# Patient Record
Sex: MALE | Race: BLACK OR AFRICAN AMERICAN | Employment: FULL TIME | URBAN - METROPOLITAN AREA
[De-identification: names, ages, dates, MRNs, and addresses within clinical notes are randomized per-mention and may not be internally consistent; named-entity substitution may affect disease eponyms.]

---

## 2018-08-21 ENCOUNTER — OFFICE VISIT (OUTPATIENT)
Dept: FAMILY MEDICINE CLINIC | Age: 28
End: 2018-08-21

## 2018-08-21 VITALS
DIASTOLIC BLOOD PRESSURE: 66 MMHG | BODY MASS INDEX: 23.77 KG/M2 | HEIGHT: 70 IN | HEART RATE: 61 BPM | SYSTOLIC BLOOD PRESSURE: 113 MMHG | TEMPERATURE: 97.8 F | RESPIRATION RATE: 16 BRPM | OXYGEN SATURATION: 100 % | WEIGHT: 166 LBS

## 2018-08-21 DIAGNOSIS — J30.1 SEASONAL ALLERGIC RHINITIS DUE TO POLLEN: ICD-10-CM

## 2018-08-21 DIAGNOSIS — G43.009 MIGRAINE WITHOUT AURA AND WITHOUT STATUS MIGRAINOSUS, NOT INTRACTABLE: Primary | ICD-10-CM

## 2018-08-21 RX ORDER — ELETRIPTAN HYDROBROMIDE 40 MG/1
40 TABLET, FILM COATED ORAL
Qty: 6 TAB | Refills: 2 | Status: SHIPPED | OUTPATIENT
Start: 2018-08-21 | End: 2018-08-21

## 2018-08-21 RX ORDER — FLUTICASONE PROPIONATE 50 MCG
2 SPRAY, SUSPENSION (ML) NASAL DAILY
Qty: 1 BOTTLE | Refills: 3 | Status: SHIPPED | OUTPATIENT
Start: 2018-08-21 | End: 2019-04-05

## 2018-08-21 RX ORDER — CETIRIZINE HCL 10 MG
10 TABLET ORAL
Qty: 30 TAB | Refills: 3 | Status: SHIPPED | OUTPATIENT
Start: 2018-08-21 | End: 2019-04-05

## 2018-08-21 NOTE — MR AVS SNAPSHOT
[address] [id number] 
[phone] Patient: Rubina Worthy MRN: [id number] XTC:[id number] Visit Information Date & Time Provider Department Dept. Phone Encounter #  
 8/21/2018  1:15 PM Dandy Huizar, [address] [place] [address] [phone] Follow-up Instructions Return in about 3 months (around 11/21/2018). Upcoming Health Maintenance Date Due DTaP/Tdap/Td series (1 - Tdap) 2/1/2009 Influenza Age 5 to Adult 8/1/2018 Allergies as of 8/21/2018  Review Complete On: 8/21/2018 By: Selvin Khanna LPN Severity Noted Reaction Type Reactions Amoxicillin  08/21/2018    Hives, Rash Current Immunizations  Never Reviewed No immunizations on file. Not reviewed this visit You Were Diagnosed With   
  
 Codes Comments Migraine without aura and without status migrainosus, not intractable    -  Primary ICD-10-CM: G43.009 ICD-9-CM: 346.10 Seasonal allergic rhinitis due to pollen     ICD-10-CM: J30.1 ICD-9-CM: 477.0 Vitals BP Pulse Temp Resp Height(growth percentile) Weight(growth percentile) 113/66 (BP 1 Location: Right arm, BP Patient Position: Sitting) 61 97.8 °F (36.6 °C) (Oral) 16 5' 10\" (1.778 m) 166 lb (75.3 kg) SpO2 BMI Smoking Status 100% 23.82 kg/m2 Never Smoker Vitals History BMI and BSA Data Body Mass Index Body Surface Area  
 23.82 kg/m 2 1.93 m 2 Preferred Pharmacy Pharmacy Name Phone [facility] [address] [place] [address] Stacey Herrera Your Updated Medication List  
  
   
This list is accurate as of 8/21/18  2:31 PM.  Always use your most recent med list.  
  
  
  
  
 cetirizine 10 mg tablet Commonly known as:  ZYRTEC Take 1 Tab by mouth nightly. eletriptan 40 mg tablet Commonly known as:  RELPAX Take 1 Tab by mouth once as needed for up to 1 dose. may repeat in 2 hours if necessary  
  
 fluticasone 50 mcg/actuation nasal spray Commonly known as:  [place] 2 Sprays by Both Nostrils route daily. Prescriptions Sent to Pharmacy Refills  
 fluticasone (FLONASE) 50 mcg/actuation nasal spray 3 Si Sprays by Both Nostrils route daily. Class: Normal  
 Pharmacy: [facility] [address] Ph #: [phone] Route: Both Nostrils  
 cetirizine (ZYRTEC) 10 mg tablet 3 Sig: Take 1 Tab by mouth nightly. Class: Normal  
 Pharmacy: [facility] [address] Ph #: [phone] Route: Oral  
 eletriptan (RELPAX) 40 mg tablet 2 Sig: Take 1 Tab by mouth once as needed for up to 1 dose. may repeat in 2 hours if necessary Class: Normal  
 Pharmacy: [facility] [address] #: [phone] Route: Oral  
  
Follow-up Instructions Return in about 3 months (around 2018). To-Do List   
 2018 Lab:  CBC WITH AUTOMATED DIFF   
  
 2018 Lab:  METABOLIC PANEL, COMPREHENSIVE   
  
 2018 Lab:  SED RATE (ESR) Introducing [facility]! Romayne Duster introduces JRK[employer] patient portal. Now you can access parts of your medical record, email your doctor's office, and request medication refills online. 1. In your internet browser, go to [email or web address] 2. Click on the First Time User? Click Here link in the Sign In box. You will see the New Member Sign Up page. 3. Enter your JRK[employer]Z Access Code exactly as it appears below. You will not need to use this code after youve completed the sign-up process. If you do not sign up before the expiration date, you must request a new code. · Ticket Cake Access Code: [email or web address] Expires: 11/19/2018  1:06 PM 
 
4. Enter the last four digits of your Social Security Number (xxxx) and Date of Birth (mm/dd/yyyy) as indicated and click Submit. You will be taken to the next sign-up page. 5. Create a Ticket Cake ID. This will be your Ticket Cake login ID and cannot be changed, so think of one that is secure and easy to remember. 6. Create a Ticket Cake password. You can change your password at any time. 7. Enter your Password Reset Question and Answer. This can be used at a later time if you forget your password. 8. Enter your e-mail address. You will receive e-mail notification when new information is available in [address] 9. Click Sign Up. You can now view and download portions of your medical record. 10. Click the Download Summary menu link to download a portable copy of your medical information. If you have questions, please visit the Frequently Asked Questions section of the [employer] Cake website. Remember, Ticket Cake is NOT to be used for urgent needs. For medical emergencies, dial 911. Now available from your iPhone and Android! Please provide this summary of care documentation to your next provider. Your primary care clinician is listed as Maegan Braxton. If you have any questions after today's visit, please call [phone].

## 2018-08-21 NOTE — PROGRESS NOTES
Identified pt with two pt identifiers(name and ). Reviewed record in preparation for visit and have obtained necessary documentation. Chief Complaint   Patient presents with    Establish Care    Headache     pt c/o recurring headaches for approx 2 wks;pt reports pain behind eyes, photosensitivity, nausea along with headaches; pt states excedrin PM helped some        Health Maintenance Due   Topic    DTaP/Tdap/Td series (1 - Tdap)    Influenza Age 5 to Adult        Coordination of Care Questionnaire:  :   1) Have you been to an emergency room, urgent care clinic since your last visit? no   Hospitalized since your last visit? no             2. Have seen or consulted any other health care provider since your last visit? NO  If yes, where when, and reason for visit? 3) Do you have an Advanced Directive/ Living Will in place? NO  If yes, do we have a copy on file NO  If no, would you like information NO    Patient is accompanied by self I have received verbal consent from Aysha Heath to discuss any/all medical information while they are present in the room.

## 2018-08-21 NOTE — PROGRESS NOTES
Mitchell Feliz is a 25year old male presenting today with complain of migraines. They started a couple weeks ago occurring in the night for about 3 consecutive nights and then resolving. He currently doesn't have the migraines as of late. He has migraines back in his teenage years and early 25s. He believes it was from a lot of eye strain. Excedrin brought relief. No provocative factors were noted. The migraines were mostly concentrated in the temporal region with with occasional diffuse radiation. They are 6/10 when the migraines flare up. They are more common at night. Pt denies N/V/CP/SOB, fever, or auras, but admits to phonophobia and photophobia and sensitivity to smell as vertigo with standing when he has the migraines. PMH:   No relevant PMH    PSH:   No Major Surgeries     Meds:   Excedrin for migraines, PO, does not known    Allergies:  - Amoxicillin, diffuse rash (believes this was just do to taking too much at one point)   -  No food or environmental allergies    FH:  - Mother, healthy, Diabetes runs in moms side (aunt and cousin)   - Father, health, No diseases known on fathers side    SH:  - No tobacco etOH or street drug use   - Immunizations up to date  - Single, Sexually active, previously multiple partners now just one, was treated for a possible STI with resolution in the past, no recent concerns   - Doesn't eat much, doesn't have much of an appetite, eats a considerable amount of sugar (candy), typical [place] diet otherwise. Avoids pork and red meat (<1/wk). -  calisthenics, weight lifting, not much cardio. 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 Positive for seasonal allergies.    - Negative for chest pain and SOB  - Negative for abdominal pain, N/V, diarrhea, constipation or blood in stools/black stools  - Negative for urinary frequency, urgency, retention or incontinence   - Negative for MSK or join pains   - Negative for concerning skin findings  - Negative for weakness, tingling, or numbness  - Negative for anxiety and depression    PHYSICAL:   - No conjunctival pallor, PERRLA, EOMI, no dizziness with EOM. Ears clear with intact TM, No erythema of nose and no congestion. No pharyngeal erythema, and no congestion, no LAD, thyroid elevates symmetrically and is of appropriate size  - Heart is RRR with no murmurs or rubs, lungs CTA in all fields   - Abdomen is nontener to palpation and has no dullness to percussion   - 2+ radial pulses, 2+ posterior tibial pulses, no LE edema   - 2+ Patellar reflexes bilateral reflexes         *ATTENTION:  This note has been created by a medical student for educational purposes only. Please do not refer to the content of this note for clinical decision-making, billing, or other purposes. Please see attending physicians note to obtain clinical information on this patient. *

## 2018-08-21 NOTE — PROGRESS NOTES
Farzaneh Miller is a 25 y.o.  male and presents with     Chief Complaint   Patient presents with    Establish Care    Headache     pt c/o recurring headaches for approx 2 wks;pt reports pain behind eyes, photosensitivity, nausea along with headaches; pt states excedrin PM helped some    Allergic Rhinitis       Pt is here to establish care. Pt works as  and says he has stress at work. Pt has been having headaches  For past 2 weeks and it seems excedrin helps to some extent. Pt says he had migraine when he was teenager. Pt says light and sound makes it worse. No FH of brain tumors, aneurysms. Past Medical History:   Diagnosis Date    Headache      History reviewed. No pertinent surgical history. Current Outpatient Prescriptions   Medication Sig    fluticasone (FLONASE) 50 mcg/actuation nasal spray 2 Sprays by Both Nostrils route daily.  cetirizine (ZYRTEC) 10 mg tablet Take 1 Tab by mouth nightly.  eletriptan (RELPAX) 40 mg tablet Take 1 Tab by mouth once as needed for up to 1 dose. may repeat in 2 hours if necessary     No current facility-administered medications for this visit. Health Maintenance   Topic Date Due    DTaP/Tdap/Td series (1 - Tdap) 02/01/2009    Influenza Age 5 to Adult  08/01/2018       There is no immunization history on file for this patient. No LMP for male patient. Allergies and Intolerances: Allergies   Allergen Reactions    Amoxicillin Hives and Rash       Family History:   Family History   Problem Relation Age of Onset    No Known Problems Mother     No Known Problems Father     Diabetes Maternal Aunt     Diabetes Cousin        Social History:   He  reports that he has never smoked. He has never used smokeless tobacco.  He  reports that he does not drink alcohol.             Review of Systems:   General: negative for - chills, fatigue, fever, weight change  Psych: negative for - anxiety, depression, irritability or mood swings  ENT: negative for - headaches, hearing change, nasal congestion, oral lesions, sneezing or sore throat  Heme/ Lymph: negative for - bleeding problems, bruising, pallor or swollen lymph nodes  Endo: negative for - hot flashes, polydipsia/polyuria or temperature intolerance  Resp: negative for - cough, shortness of breath or wheezing  CV: negative for - chest pain, edema or palpitations  GI: negative for - abdominal pain, change in bowel habits, constipation, diarrhea or nausea/vomiting  : negative for - dysuria, hematuria, incontinence, pelvic pain or vulvar/vaginal symptoms  MSK: negative for - joint pain, joint swelling or muscle pain  Neuro: negative for - confusion, pos for  headaches  Derm: negative for - dry skin, hair changes, rash or skin lesion changes          Physical:   Vitals:   Vitals:    08/21/18 1338   BP: 113/66   Pulse: 61   Resp: 16   Temp: 97.8 °F (36.6 °C)   TempSrc: Oral   SpO2: 100%   Weight: 166 lb (75.3 kg)   Height: 5' 10\" (1.778 m)           Exam:   HEENT- atraumatic,normocephalic, awake, oriented, well nourished,nose severely congested. thrpat congested. Neck - supple,no enlarged lymph nodes, no JVD, no thyromegaly  Chest- CTA, no rhonchi, no crackles  Heart- rrr, no murmurs / gallop/rub  Abdomen- soft,BS+,NT, no hepatosplenomegaly  Ext - no c/c/edema   Neuro- no focal deficits. Power 5/5 all extremities  Skin - warm,dry, no obvious rashes. Review of Data:   LABS:   No results found for: WBC, HGB, HCT, PLT, HGBEXT, HCTEXT, PLTEXT, HGBEXT, HCTEXT, PLTEXT  No results found for: NA, K, CL, CO2, GLU, BUN, CREA  No results found for: CHOL, CHOLX, CHLST, CHOLV, HDL, LDL, LDLC, DLDLP, TGLX, TRIGL, TRIGP  No results found for: GPT        Impression / Plan:        ICD-10-CM ICD-9-CM    1.  Migraine without aura and without status migrainosus, not intractable G43.009 346.10 CBC WITH AUTOMATED DIFF      METABOLIC PANEL, COMPREHENSIVE      eletriptan (RELPAX) 40 mg tablet      SED RATE (ESR)      CBC WITH AUTOMATED DIFF      METABOLIC PANEL, COMPREHENSIVE      SED RATE (ESR)   2. Seasonal allergic rhinitis due to pollen J30.1 477.0 fluticasone (FLONASE) 50 mcg/actuation nasal spray      cetirizine (ZYRTEC) 10 mg tablet         Explained to patient risk benefits of the medications. Advised patient to stop meds if having any side effects. Pt verbalized understanding of the instructions. I have discussed the diagnosis with the patient and the intended plan as seen in the above orders. The patient has received an after-visit summary and questions were answered concerning future plans. I have discussed medication side effects and warnings with the patient as well. I have reviewed the plan of care with the patient, accepted their input and they are in agreement with the treatment goals. Reviewed plan of care. Patient has provided input and agrees with goals. Follow-up Disposition:  Return in about 3 months (around 11/21/2018).     Katina Rocha MD

## 2018-08-22 LAB
A-G RATIO,AGRAT: 1.6 RATIO (ref 1.1–2.6)
ABSOLUTE LYMPHOCYTE COUNT, 10803: 2.1 K/UL (ref 1–4.8)
ALBUMIN SERPL-MCNC: 4.6 G/DL (ref 3.5–5)
ALP SERPL-CCNC: 63 U/L (ref 25–115)
ALT SERPL-CCNC: 15 U/L (ref 5–40)
ANION GAP SERPL CALC-SCNC: 15 MMOL/L
AST SERPL W P-5'-P-CCNC: 13 U/L (ref 10–37)
BASOPHILS # BLD: 0 K/UL (ref 0–0.2)
BASOPHILS NFR BLD: 1 % (ref 0–2)
BILIRUB SERPL-MCNC: 0.4 MG/DL (ref 0.2–1.2)
BUN SERPL-MCNC: 6 MG/DL (ref 6–22)
CALCIUM SERPL-MCNC: 9 MG/DL (ref 8.4–10.4)
CHLORIDE SERPL-SCNC: 103 MMOL/L (ref 98–110)
CO2 SERPL-SCNC: 26 MMOL/L (ref 20–32)
CREAT SERPL-MCNC: 1 MG/DL (ref 0.5–1.2)
EOSINOPHIL # BLD: 0.1 K/UL (ref 0–0.5)
EOSINOPHIL NFR BLD: 3 % (ref 0–6)
ERYTHROCYTE [DISTWIDTH] IN BLOOD BY AUTOMATED COUNT: 13.5 % (ref 10–15.5)
GFRAA, 66117: >60
GFRNA, 66118: >60
GLOBULIN,GLOB: 2.8 G/DL (ref 2–4)
GLUCOSE SERPL-MCNC: 89 MG/DL (ref 70–99)
GRANULOCYTES,GRANS: 44 % (ref 40–75)
HCT VFR BLD AUTO: 41 % (ref 36.6–51.9)
HGB BLD-MCNC: 13.4 G/DL (ref 13.2–17.3)
LYMPHOCYTES, LYMLT: 46 % (ref 20–45)
MCH RBC QN AUTO: 31 PG (ref 26–34)
MCHC RBC AUTO-ENTMCNC: 33 G/DL (ref 31–36)
MCV RBC AUTO: 95 FL (ref 80–95)
MONOCYTES # BLD: 0.3 K/UL (ref 0.1–1)
MONOCYTES NFR BLD: 6 % (ref 3–12)
NEUTROPHILS # BLD AUTO: 2 K/UL (ref 1.8–7.7)
PLATELET # BLD AUTO: 224 K/UL (ref 140–440)
PMV BLD AUTO: 10.3 FL (ref 9–13)
POTASSIUM SERPL-SCNC: 4.2 MMOL/L (ref 3.5–5.5)
PROT SERPL-MCNC: 7.4 G/DL (ref 6.4–8.3)
RBC # BLD AUTO: 4.31 M/UL (ref 3.8–5.8)
SED RATE (ESR): 3 MM/HR (ref 0–15)
SODIUM SERPL-SCNC: 144 MMOL/L (ref 133–145)
WBC # BLD AUTO: 4.4 K/UL (ref 4–11)

## 2019-01-22 ENCOUNTER — OFFICE VISIT (OUTPATIENT)
Dept: FAMILY MEDICINE CLINIC | Age: 29
End: 2019-01-22

## 2019-01-22 ENCOUNTER — HOSPITAL ENCOUNTER (OUTPATIENT)
Dept: LAB | Age: 29
Discharge: HOME | End: 2019-01-22

## 2019-01-22 VITALS
RESPIRATION RATE: 18 BRPM | TEMPERATURE: 98.2 F | OXYGEN SATURATION: 100 % | BODY MASS INDEX: 22.33 KG/M2 | SYSTOLIC BLOOD PRESSURE: 133 MMHG | WEIGHT: 156 LBS | HEART RATE: 65 BPM | DIASTOLIC BLOOD PRESSURE: 68 MMHG | HEIGHT: 70 IN

## 2019-01-22 DIAGNOSIS — Z00.00 ANNUAL PHYSICAL EXAM: Primary | ICD-10-CM

## 2019-01-22 DIAGNOSIS — Z00.00 ANNUAL PHYSICAL EXAM: ICD-10-CM

## 2019-01-22 LAB — SENTARA SPECIMEN COL,SENBCF: NORMAL

## 2019-01-22 PROCEDURE — 99001 SPECIMEN HANDLING PT-LAB: CPT

## 2019-01-22 NOTE — PROGRESS NOTES
Lucio Silvestre is a 25 y.o.  male and presents with Chief Complaint Patient presents with  Results  Annual Exam  
 
 
Pt is here for annual physical. Pt does not drink or smoke . He works as a . No urinary symptoms No rectal bleeding , melena. Pt has mild wt loss but he says he weigher more ine the past and now is at his baseline. He says migraine headaches have improved. Past Medical History:  
Diagnosis Date  Headache No past surgical history on file. Current Outpatient Medications Medication Sig  
 fluticasone (FLONASE) 50 mcg/actuation nasal spray 2 Sprays by Both Nostrils route daily.  cetirizine (ZYRTEC) 10 mg tablet Take 1 Tab by mouth nightly. No current facility-administered medications for this visit. Health Maintenance Topic Date Due  
 DTaP/Tdap/Td series (1 - Tdap) 02/01/2009  Influenza Age 5 to Adult  08/01/2018 There is no immunization history on file for this patient. No LMP for male patient. Allergies and Intolerances: Allergies Allergen Reactions  Amoxicillin Hives and Rash Family History:  
Family History Problem Relation Age of Onset  No Known Problems Mother  No Known Problems Father  Diabetes Maternal Aunt  Diabetes Cousin Social History: He  reports that  has never smoked. he has never used smokeless tobacco.  He  reports that he does not drink alcohol. Review of Systems:  
General: negative for - chills, fatigue, fever, weight change Psych: negative for - anxiety, depression, irritability or mood swings ENT: negative for - headaches, hearing change, nasal congestion, oral lesions, sneezing or sore throat Heme/ Lymph: negative for - bleeding problems, bruising, pallor or swollen lymph nodes Endo: negative for - hot flashes, polydipsia/polyuria or temperature intolerance Resp: negative for - cough, shortness of breath or wheezing CV: negative for - chest pain, edema or palpitations GI: negative for - abdominal pain, change in bowel habits, constipation, diarrhea or nausea/vomiting : negative for - dysuria, hematuria, incontinence, pelvic pain or vulvar/vaginal symptoms MSK: negative for - joint pain, joint swelling or muscle pain Neuro: negative for - confusion, headaches, seizures or weakness Derm: negative for - dry skin, hair changes, rash or skin lesion changes Physical:  
Vitals:  
Vitals:  
 01/22/19 1400 BP: 133/68 Pulse: 65 Resp: 18 Temp: 98.2 °F (36.8 °C) SpO2: 100% Weight: 156 lb (70.8 kg) Height: 5' 10\" (1.778 m) Exam:  
HEENT- atraumatic,normocephalic, awake, oriented, well nourished Neck - supple,no enlarged lymph nodes, no JVD, no thyromegaly Chest- CTA, no rhonchi, no crackles Heart- rrr, no murmurs / gallop/rub Abdomen- soft,BS+,NT, no hepatosplenomegaly Ext - no c/c/edema Neuro- no focal deficits. Power 5/5 all extremities Skin - warm,dry, no obvious rashes. Review of Data:  
LABS:  
Lab Results Component Value Date/Time WBC 4.4 08/21/2018 02:39 PM  
 HGB 13.4 08/21/2018 02:39 PM  
 HCT 41.0 08/21/2018 02:39 PM  
 PLATELET 762 56/10/9664 02:39 PM  
 
Lab Results Component Value Date/Time Sodium 144 08/21/2018 02:39 PM  
 Potassium 4.2 08/21/2018 02:39 PM  
 Chloride 103 08/21/2018 02:39 PM  
 CO2 26 08/21/2018 02:39 PM  
 Glucose 89 08/21/2018 02:39 PM  
 BUN 6 08/21/2018 02:39 PM  
 Creatinine 1.0 08/21/2018 02:39 PM  
 
No results found for: CHOL, CHOLX, CHLST, CHOLV, HDL, LDL, LDLC, DLDLP, TGLX, TRIGL, TRIGP No results found for: GPT Impression / Plan: ICD-10-CM ICD-9-CM 1. Annual physical exam Z00.00 V70.0 LIPID PANEL  
   TSH 3RD GENERATION  
   AMB POC EKG ROUTINE W/ 12 LEADS, INTER & REP  
 
EKG - normal sinus rhythm, no acute ST- T changes, patholgical Q waves. Headaches - resolved. Explained to patient risk benefits of the medications. Advised patient to stop meds if having any side effects. Pt verbalized understanding of the instructions. I have discussed the diagnosis with the patient and the intended plan as seen in the above orders. The patient has received an after-visit summary and questions were answered concerning future plans. I have discussed medication side effects and warnings with the patient as well. I have reviewed the plan of care with the patient, accepted their input and they are in agreement with the treatment goals. Reviewed plan of care. Patient has provided input and agrees with goals. Follow-up Disposition: 
Return in about 1 year (around 1/22/2020).  
 
Natan Garcia MD

## 2019-01-23 LAB
CHOLEST SERPL-MCNC: 171 MG/DL (ref 110–200)
HDLC SERPL-MCNC: 2.9 MG/DL (ref 0–5)
HDLC SERPL-MCNC: 59 MG/DL (ref 40–59)
LDLC SERPL CALC-MCNC: 99 MG/DL (ref 50–99)
TRIGL SERPL-MCNC: 64 MG/DL (ref 40–149)
TSH SERPL DL<=0.005 MIU/L-ACNC: 1.64 MCU/ML (ref 0.27–4.2)
VLDLC SERPL CALC-MCNC: 13 MG/DL (ref 8–30)

## 2019-04-05 ENCOUNTER — ANESTHESIA EVENT (OUTPATIENT)
Dept: SURGERY | Age: 29
End: 2019-04-05
Payer: COMMERCIAL

## 2019-04-05 NOTE — PERIOP NOTES
PAT - SURGICAL PRE-ADMISSION INSTRUCTIONS 
 
NAME:  Julia Rangel                                                          TODAY'S DATE:  4/5/2019 SURGERY DATE:  4/8/2019                                  SURGERY ARRIVAL TIME:   TBA 1. Do NOT eat or drink anything, including candy or gum, after MIDNIGHT on 4/7/19 , unless you have specific instructions from your Surgeon or Anesthesia Provider to do so. 2. No smoking on the day of surgery. 3. No alcohol 24 hours prior to the day of surgery. 4. No recreational drugs for one week prior to the day of surgery. 5. Leave all valuables, including money/purse, at home. 6. Remove all jewelry, nail polish, makeup (including mascara); no lotions, powders, deodorant, or perfume/cologne/after shave. 7. Glasses/Contact lenses and Dentures may be worn to the hospital.  They will be removed prior to surgery. 8. Call your doctor if symptoms of a cold or illness develop within 24 ours prior to surgery. 9. AN ADULT MUST DRIVE YOU HOME AFTER OUTPATIENT SURGERY. 10. If you are having an OUTPATIENT procedure, please make arrangements for a responsible adult to be with you for 24 hours after your surgery. 11. If you are admitted to the hospital, you will be assigned to a bed after surgery is complete. Normally a family member will not be able to see you until you are in your assigned bed. 15. Family is encouraged to accompany you to the hospital.  We ask visitors in the treatment area to be limited to ONE person at a time to ensure patient privacy. EXCEPTIONS WILL BE MADE AS NEEDED. 15. Children under 12 are discouraged from entering the treatment area and need to be supervised by an adult when in the waiting room. Special Instructions: 
 
NONE. Patient Prep: 
 
shower with anti-bacterial soap These surgical instructions were reviewed with PATIENT during the PAT PHONE CALL. Directions:   On the morning of surgery, please go to the [facility]. Enter the building from the [facility] entrance, 1st floor (next to the Emergency Room entrance). Take the elevator to the 2nd floor. Sign in at the Registration Desk. If you have any questions and/or concerns, please do not hesitate to call: 
(Prior to the day of surgery)  [facility] unit:  [phone] (Day of surgery)  [facility] unit:  [phone]

## 2019-04-08 ENCOUNTER — ANESTHESIA (OUTPATIENT)
Dept: SURGERY | Age: 29
End: 2019-04-08
Payer: COMMERCIAL

## 2019-04-08 ENCOUNTER — HOSPITAL ENCOUNTER (OUTPATIENT)
Age: 29
Setting detail: OUTPATIENT SURGERY
Discharge: HOME | End: 2019-04-08
Attending: OPHTHALMOLOGY | Admitting: OPHTHALMOLOGY
Payer: COMMERCIAL

## 2019-04-08 VITALS
SYSTOLIC BLOOD PRESSURE: 136 MMHG | HEART RATE: 78 BPM | RESPIRATION RATE: 16 BRPM | DIASTOLIC BLOOD PRESSURE: 52 MMHG | BODY MASS INDEX: 21.93 KG/M2 | TEMPERATURE: 98 F | OXYGEN SATURATION: 99 % | HEIGHT: 70 IN | WEIGHT: 153.19 LBS

## 2019-04-08 PROCEDURE — 74011250636 HC RX REV CODE- 250/636

## 2019-04-08 PROCEDURE — 74011250636 HC RX REV CODE- 250/636: Performed by: OPHTHALMOLOGY

## 2019-04-08 PROCEDURE — 74011000250 HC RX REV CODE- 250: Performed by: OPHTHALMOLOGY

## 2019-04-08 PROCEDURE — 77030029082 HC LEN VITRCTMY DISP DTCH -B: Performed by: OPHTHALMOLOGY

## 2019-04-08 PROCEDURE — 74011250637 HC RX REV CODE- 250/637: Performed by: NURSE ANESTHETIST, CERTIFIED REGISTERED

## 2019-04-08 PROCEDURE — 76010000161 HC OR TIME 1 TO 1.5 HR INTENSV-TIER 1: Performed by: OPHTHALMOLOGY

## 2019-04-08 PROCEDURE — 77030013311: Performed by: OPHTHALMOLOGY

## 2019-04-08 PROCEDURE — 77030026076 HC LNS MCSCP SPRVW DSP MCGM -B: Performed by: OPHTHALMOLOGY

## 2019-04-08 PROCEDURE — 77030018838 HC SOL IRR OPTH ALCN -B: Performed by: OPHTHALMOLOGY

## 2019-04-08 PROCEDURE — 76060000033 HC ANESTHESIA 1 TO 1.5 HR: Performed by: OPHTHALMOLOGY

## 2019-04-08 PROCEDURE — 77030016693: Performed by: OPHTHALMOLOGY

## 2019-04-08 PROCEDURE — 76210000020 HC REC RM PH II FIRST 0.5 HR: Performed by: OPHTHALMOLOGY

## 2019-04-08 PROCEDURE — 74011250636 HC RX REV CODE- 250/636: Performed by: NURSE ANESTHETIST, CERTIFIED REGISTERED

## 2019-04-08 PROCEDURE — 77030018846 HC SOL IRR STRL H20 ICUM -A: Performed by: OPHTHALMOLOGY

## 2019-04-08 RX ORDER — CYCLOPENTOLATE HYDROCHLORIDE 10 MG/ML
1 SOLUTION/ DROPS OPHTHALMIC
Status: COMPLETED | OUTPATIENT
Start: 2019-04-08 | End: 2019-04-08

## 2019-04-08 RX ORDER — NEOMYCIN SULFATE, POLYMYXIN B SULFATE, AND DEXAMETHASONE 3.5; 10000; 1 MG/G; [USP'U]/G; MG/G
OINTMENT OPHTHALMIC AS NEEDED
Status: DISCONTINUED | OUTPATIENT
Start: 2019-04-08 | End: 2019-04-08 | Stop reason: HOSPADM

## 2019-04-08 RX ORDER — PROPOFOL 10 MG/ML
INJECTION, EMULSION INTRAVENOUS AS NEEDED
Status: DISCONTINUED | OUTPATIENT
Start: 2019-04-08 | End: 2019-04-08 | Stop reason: HOSPADM

## 2019-04-08 RX ORDER — PROPARACAINE HYDROCHLORIDE 5 MG/ML
1 SOLUTION/ DROPS OPHTHALMIC ONCE
Status: COMPLETED | OUTPATIENT
Start: 2019-04-08 | End: 2019-04-08

## 2019-04-08 RX ORDER — GENTAMICIN SULFATE 40 MG/ML
INJECTION, SOLUTION INTRAMUSCULAR; INTRAVENOUS AS NEEDED
Status: DISCONTINUED | OUTPATIENT
Start: 2019-04-08 | End: 2019-04-08 | Stop reason: HOSPADM

## 2019-04-08 RX ORDER — DEXAMETHASONE SODIUM PHOSPHATE 4 MG/ML
INJECTION, SOLUTION INTRA-ARTICULAR; INTRALESIONAL; INTRAMUSCULAR; INTRAVENOUS; SOFT TISSUE AS NEEDED
Status: DISCONTINUED | OUTPATIENT
Start: 2019-04-08 | End: 2019-04-08 | Stop reason: HOSPADM

## 2019-04-08 RX ORDER — TRIAMCINOLONE ACETONIDE 40 MG/ML
INJECTION, SUSPENSION INTRA-ARTICULAR; INTRAMUSCULAR AS NEEDED
Status: DISCONTINUED | OUTPATIENT
Start: 2019-04-08 | End: 2019-04-08 | Stop reason: HOSPADM

## 2019-04-08 RX ORDER — LIDOCAINE HYDROCHLORIDE 10 MG/ML
0.1 INJECTION, SOLUTION EPIDURAL; INFILTRATION; INTRACAUDAL; PERINEURAL AS NEEDED
Status: DISCONTINUED | OUTPATIENT
Start: 2019-04-08 | End: 2019-04-08 | Stop reason: HOSPADM

## 2019-04-08 RX ORDER — FAMOTIDINE 20 MG/1
20 TABLET, FILM COATED ORAL ONCE
Status: COMPLETED | OUTPATIENT
Start: 2019-04-08 | End: 2019-04-08

## 2019-04-08 RX ORDER — SODIUM CHLORIDE 0.9 % (FLUSH) 0.9 %
5-40 SYRINGE (ML) INJECTION EVERY 8 HOURS
Status: DISCONTINUED | OUTPATIENT
Start: 2019-04-08 | End: 2019-04-08 | Stop reason: HOSPADM

## 2019-04-08 RX ORDER — SODIUM CHLORIDE 0.9 % (FLUSH) 0.9 %
5-40 SYRINGE (ML) INJECTION AS NEEDED
Status: DISCONTINUED | OUTPATIENT
Start: 2019-04-08 | End: 2019-04-08 | Stop reason: HOSPADM

## 2019-04-08 RX ORDER — SODIUM CHLORIDE, SODIUM LACTATE, POTASSIUM CHLORIDE, CALCIUM CHLORIDE 600; 310; 30; 20 MG/100ML; MG/100ML; MG/100ML; MG/100ML
25 INJECTION, SOLUTION INTRAVENOUS CONTINUOUS
Status: DISCONTINUED | OUTPATIENT
Start: 2019-04-08 | End: 2019-04-08 | Stop reason: HOSPADM

## 2019-04-08 RX ORDER — PHENYLEPHRINE HYDROCHLORIDE 25 MG/ML
1 SOLUTION/ DROPS OPHTHALMIC
Status: COMPLETED | OUTPATIENT
Start: 2019-04-08 | End: 2019-04-08

## 2019-04-08 RX ORDER — MIDAZOLAM HYDROCHLORIDE 1 MG/ML
INJECTION, SOLUTION INTRAMUSCULAR; INTRAVENOUS AS NEEDED
Status: DISCONTINUED | OUTPATIENT
Start: 2019-04-08 | End: 2019-04-08 | Stop reason: HOSPADM

## 2019-04-08 RX ORDER — LIDOCAINE HYDROCHLORIDE 20 MG/ML
INJECTION, SOLUTION EPIDURAL; INFILTRATION; INTRACAUDAL; PERINEURAL AS NEEDED
Status: DISCONTINUED | OUTPATIENT
Start: 2019-04-08 | End: 2019-04-08 | Stop reason: HOSPADM

## 2019-04-08 RX ADMIN — SODIUM CHLORIDE, SODIUM LACTATE, POTASSIUM CHLORIDE, AND CALCIUM CHLORIDE: 600; 310; 30; 20 INJECTION, SOLUTION INTRAVENOUS at 15:15

## 2019-04-08 RX ADMIN — SODIUM CHLORIDE, SODIUM LACTATE, POTASSIUM CHLORIDE, AND CALCIUM CHLORIDE 25 ML/HR: 600; 310; 30; 20 INJECTION, SOLUTION INTRAVENOUS at 13:38

## 2019-04-08 RX ADMIN — PHENYLEPHRINE HYDROCHLORIDE 1 DROP: 2.5 SOLUTION/ DROPS OPHTHALMIC at 13:41

## 2019-04-08 RX ADMIN — PHENYLEPHRINE HYDROCHLORIDE 1 DROP: 2.5 SOLUTION/ DROPS OPHTHALMIC at 13:27

## 2019-04-08 RX ADMIN — MIDAZOLAM HYDROCHLORIDE 2 MG: 1 INJECTION, SOLUTION INTRAMUSCULAR; INTRAVENOUS at 15:12

## 2019-04-08 RX ADMIN — LIDOCAINE HYDROCHLORIDE 40 MG: 20 INJECTION, SOLUTION EPIDURAL; INFILTRATION; INTRACAUDAL; PERINEURAL at 15:20

## 2019-04-08 RX ADMIN — FAMOTIDINE 20 MG: 20 TABLET, FILM COATED ORAL at 13:29

## 2019-04-08 RX ADMIN — PROPOFOL 60 MG: 10 INJECTION, EMULSION INTRAVENOUS at 15:20

## 2019-04-08 RX ADMIN — CYCLOPENTOLATE HYDROCHLORIDE 1 DROP: 10 SOLUTION/ DROPS OPHTHALMIC at 13:28

## 2019-04-08 RX ADMIN — PHENYLEPHRINE HYDROCHLORIDE 1 DROP: 2.5 SOLUTION/ DROPS OPHTHALMIC at 14:09

## 2019-04-08 RX ADMIN — PROPARACAINE HYDROCHLORIDE 1 DROP: 5 SOLUTION/ DROPS OPHTHALMIC at 13:25

## 2019-04-08 RX ADMIN — CYCLOPENTOLATE HYDROCHLORIDE 1 DROP: 10 SOLUTION/ DROPS OPHTHALMIC at 13:41

## 2019-04-08 RX ADMIN — CYCLOPENTOLATE HYDROCHLORIDE 1 DROP: 10 SOLUTION/ DROPS OPHTHALMIC at 14:08

## 2019-04-08 NOTE — PERIOP NOTES
Pre-Op Shift Report Report given to JACKI Rachel RN. Patient is resting quietly. Family/friend is at bedside. Surgery consent complete. Anesthesia consent complete. Pre-op checklist complete. Call bell within reach.

## 2019-04-08 NOTE — DISCHARGE INSTRUCTIONS
Patient Education        Vitrectomy: What to Expect at Home  Your Recovery    Vitrectomy is a surgery to remove the vitreous gel from the middle of your eye. Vitreous gel (also called vitreous humor) is a thick, colorless, gel-like fluid that fills the large space in the middle of the eye, behind the lens. It helps the eyeball maintain its shape. During surgery, the doctor used small tools to remove the vitreous gel. (After a while, the eye makes new fluid that fills in the space again.) Then the doctor may have treated eye problems, such as a retinal detachment, a vitreous hemorrhage (bleeding in the eye), scar tissue on the retina, or tears or holes in the macula, an important part of the retina. The retina is the layer of nerve tissue at the back of the eye. At the end of the surgery, the doctor may have injected an oil or gas bubble into the eye. It lightly presses the retina against the wall of the eye. You will need to keep your head in a certain position for most of the day and night while the eye heals. If an oil bubble is used, you will need another surgery to remove the oil after the eye has healed. After the surgery, your eye may be swollen, red, or tender for several weeks. You might have some pain in your eye and your vision may be blurry for a few days after the surgery. You will need 2 to 4 weeks to recover before you can do your normal activities again. It may take longer for your vision to get back to normal.  This care sheet gives you a general idea about how long it will take for you to recover. But each person recovers at a different pace. Follow the steps below to get better as quickly as possible. How can you care for yourself at home? Activity    · Rest when you feel tired.     · Allow the eye to heal. Don't do things that might cause you to move your head.  This includes moving quickly, lifting anything heavy, or doing activities such as cleaning or gardening.     · If your doctor used an oil or gas bubble to hold the retina in place, keep your head in a certain position for most of the day and night for 1 to 3 weeks after the surgery. Make a plan for this part of your recovery, because it will be hard to do some daily activities. Your doctor will give you specific instructions. ? Do not lie on your back, or the bubble will move to the front of the eye and press against the lens instead of the retina.     · If your doctor used a gas bubble, avoid airplane travel until your doctor tells you it is safe. This is because the change in altitude may cause the gas bubble to expand and increase the pressure inside the eye.     · You will probably need to take 2 to 4 weeks off from work. It depends on the type of work you do and how you feel.     · You may drive when your vision allows it. If you are not sure, ask your doctor. Diet    · You can eat your normal diet. If your stomach is upset, try bland, low-fat foods like plain rice, broiled chicken, toast, and yogurt. Medicines    · Your doctor will tell you if and when you can restart your medicines. He or she will also give you instructions about taking any new medicines.     · If you take aspirin or some other blood thinner, be sure to talk to your doctor. He or she will tell you if and when to start taking this medicine again. Make sure that you understand exactly what your doctor wants you to do.     · Be safe with medicines. Read and follow all instructions on the label. ? If the doctor gave you a prescription medicine for pain, take it as prescribed. ? If you are not taking a prescription pain medicine, ask your doctor if you can take an over-the-counter medicine.     · You will need to use eyedrops for up to 6 weeks. Ice and elevation    · Close your eye and put ice or a cold pack on it for 10 to 20 minutes at a time. Try to do this every 1 to 2 hours for the next 3 days (when you are awake) or until the swelling goes down.  Put a thin cloth between the ice and your skin. Other instructions    · You can shower and wash your hair and face. But don't get any soap in your eye. You may want to use a wash cloth to wash your face. Some people wear swimming goggles.     · Wear sunglasses during the day. You may have to wear an eye patch or shield for a few days. Follow-up care is a key part of your treatment and safety. Be sure to make and go to all appointments, and call your doctor if you are having problems. It's also a good idea to know your test results and keep a list of the medicines you take. When should you call for help? Call 911 anytime you think you may need emergency care. For example, call if:    · You have a sudden loss of vision.     · You have severe eye pain.    Call your doctor now or seek immediate medical care if:    · Your vision gets worse.     · You have new or increasing eye pain.     · You have symptoms of an eye infection, such as:  ? Pus or thick discharge coming from the eye.  ? Redness or swelling around the eye.  ? A fever.     · You have vision changes or see new flashes or floaters. (Flashes are \"martin\" that you may see when you move your head. Floaters are shadows or dark objects that \"float\" across your field of vision.)    Watch closely for changes in your health, and be sure to contact your doctor if:    · You do not get better as expected. Where can you learn more? Go to http://luly-nico.info/. Enter T466 in the search box to learn more about \"Vitrectomy: What to Expect at Home. \"  Current as of: July 17, 2018  Content Version: 11.9  © 5318-9137 [employer]. Care instructions adapted under license by [employer] (which disclaims liability or warranty for this information).  If you have questions about a medical condition or this instruction, always ask your healthcare professional. [address] any warranty or liability for your use of this information. DISCHARGE SUMMARY from Nurse    PATIENT INSTRUCTIONS:    After general anesthesia or intravenous sedation, for 24 hours or while taking prescription Narcotics:  · Limit your activities  · Do not drive and operate hazardous machinery  · Do not make important personal or business decisions  · Do  not drink alcoholic beverages  · If you have not urinated within 8 hours after discharge, please contact your surgeon on call. Report the following to your surgeon:  · Excessive pain, swelling, redness or odor of or around the surgical area  · Temperature over 100.5  · Nausea and vomiting lasting longer than 4 hours or if unable to take medications  · Any signs of decreased circulation or nerve impairment to extremity: change in color, persistent  numbness, tingling, coldness or increase pain  · Any questions    What to do at Home:  Recommended activity: Activity as tolerated and no driving for today    These are general instructions for a healthy lifestyle:    No smoking/ No tobacco products/ Avoid exposure to second hand smoke  Surgeon General's Warning:  Quitting smoking now greatly reduces serious risk to your health. Obesity, smoking, and sedentary lifestyle greatly increases your risk for illness    A healthy diet, regular physical exercise & weight monitoring are important for maintaining a healthy lifestyle    You may be retaining fluid if you have a history of heart failure or if you experience any of the following symptoms:  Weight gain of 3 pounds or more overnight or 5 pounds in a week, increased swelling in our hands or feet or shortness of breath while lying flat in bed. Please call your doctor as soon as you notice any of these symptoms; do not wait until your next office visit.     Recognize signs and symptoms of STROKE:    F-face looks uneven    A-arms unable to move or move unevenly    S-speech slurred or non-existent    T-time-call 911 as soon as signs and symptoms begin-DO NOT go Back to bed or wait to see if you get better-TIME IS BRAIN. Warning Signs of HEART ATTACK     Call 911 if you have these symptoms:   Chest discomfort. Most heart attacks involve discomfort in the center of the chest that lasts more than a few minutes, or that goes away and comes back. It can feel like uncomfortable pressure, squeezing, fullness, or pain.  Discomfort in other areas of the upper body. Symptoms can include pain or discomfort in one or both arms, the back, neck, jaw, or stomach.  Shortness of breath with or without chest discomfort.  Other signs may include breaking out in a cold sweat, nausea, or lightheadedness. Don't wait more than five minutes to call 911 - MINUTES MATTER! Fast action can save your life. Calling 911 is almost always the fastest way to get lifesaving treatment. Emergency Medical Services staff can begin treatment when they arrive -- up to an hour sooner than if someone gets to the hospital by car. The discharge information has been reviewed with the patient. The patient verbalized understanding. Discharge medications reviewed with the patient and appropriate educational materials and side effects teaching were provided. Patient armband removed and given to patient to take home. Patient was informed of the privacy risks if armband lost or stolen.     ___________________________________________________________________________________________________________________________________

## 2019-04-08 NOTE — PERIOP NOTES
Phase 2 Recovery Summary Patient arrived to Phase 2 at 80 Report received from [place] Vitals:  
 04/05/19 0956 04/08/19 1315 04/08/19 1630 BP:  128/75 136/52 Pulse:  78 78 Resp:  16 16 Temp:  97 °F (36.1 °C) 98 °F (36.7 °C) SpO2:  100% 99% Weight: 72.6 kg (160 lb) 69.5 kg (153 lb 3 oz) Height: 5' 10\" (1.778 m) oriented to time, place, person and situation Lines and Drains Peripheral Intravenous Line:  
Peripheral IV 04/08/19 Left Arm (Active) Site Assessment Clean, dry, & intact 4/8/2019  4:36 PM  
Phlebitis Assessment 0 4/8/2019  4:36 PM  
Infiltration Assessment 0 4/8/2019  4:36 PM  
Dressing Status Clean, dry, & intact 4/8/2019  4:36 PM  
Dressing Type Tape;Transparent 4/8/2019  4:36 PM  
Hub Color/Line Status Pink; Infusing 4/8/2019  4:36 PM  
Alcohol Cap Used Yes 4/8/2019  1:37 PM  
 
 
Wound Wound Eye Left (Active) Dressing Status Clean, dry, and intact 4/8/2019  4:41 PM  
Dressing Type Eye patch; Eye shield 4/8/2019  4:41 PM  
Number of days: 0 Patient arrived to phase 2 from OR. Alert and oriented x4. No complaints of pain, nausea or dizziness. Vital signs taken. Patient ambulated from bed to chair with minimal assistance. Mother brought back to recovery room. IV removed and patient allowed to get dressed. Reviewed discharge instructions. Mother signed for discharge. Patient transported to vehicle via wheelchair. Patient discharged to home with Mother, Akash Dowling  at [address] [place] [address]

## 2019-04-08 NOTE — ANESTHESIA PREPROCEDURE EVALUATION
Relevant Problems No relevant active problems Anesthetic History No history of anesthetic complications Review of Systems / Medical History Patient summary reviewed and pertinent labs reviewed Pulmonary Within defined limits Neuro/Psych Within defined limits Cardiovascular Within defined limits Exercise tolerance: >4 METS 
  
GI/Hepatic/Renal 
Within defined limits Endo/Other Within defined limits Other Findings Physical Exam 
 
Airway Mallampati: I 
TM Distance: 4 - 6 cm Neck ROM: normal range of motion Mouth opening: Normal 
 
 Cardiovascular Regular rate and rhythm,  S1 and S2 normal,  no murmur, click, rub, or gallop Rhythm: regular Rate: normal 
 
 
 
 Dental 
 
Dentition: Lower dentition intact and Upper dentition intact Pulmonary Breath sounds clear to auscultation Abdominal 
GI exam deferred Other Findings Anesthetic Plan ASA: 1 Anesthesia type: general 
 
 
 
 
Induction: Intravenous Anesthetic plan and risks discussed with: Patient

## 2019-04-08 NOTE — PERIOP NOTES
Pre-Op Summary Pt arrived via car with family/friend and is oriented to time, place, person and situation. Patient with steady gait with none assistive devices. Visit Vitals /75 (BP 1 Location: Right arm, BP Patient Position: At rest) Pulse 78 Temp 97 °F (36.1 °C) Resp 16 Ht 5' 10\" (1.778 m) Wt 69.5 kg (153 lb 3 oz) SpO2 100% BMI 21.98 kg/m² Peripheral IV located on Left hand . Patients belongings are located given to mother. Patient's point of contact is mother Schilling and their contact number is: [phone]. They will be in the waiting room. They are able to receive medication information. They will be their ride home.

## 2019-04-08 NOTE — H&P
Date of Surgery Update: 
Demian Garcia Seen was seen and examined. History and physical has been reviewed. The patient has been examined.  There have been no significant clinical changes since the completion of the originally dated History and Physical. 
 
Signed By: Donna Goldsmith MD   
 April 8, 2019 12:16 PM

## 2019-04-08 NOTE — OP NOTES
Operative Report       Patient: Tirston Hernandez MRN: [id number]  SSN: [id number]    YOB: 1990  Age: 30 y.o. Sex: male       DATE OF OPERATION: 4/8/2019    SURGEON: Kerry Beltrán MD    ASSISTANT: none    PREOPERATIVE DIAGNOSIS:  Rhegmatogenous retinal detachment, left eye. POSTOPERATIVE DIAGNOSIS: Rhegmatogenous retinal detachment, left eye. OPERATION:  Pars plana vitrectomy, air/fluid exchange, endolaser,  14 % C3F8 gas, left eye    ANESTHESIA:   Local MAC    COMPLICATIONS: None. ESTIMATED BLOOD LOSS:  Minimal.    DESCRIPTION OF PROCEDURE:   After informed consent, the patient received a local retrobulbar injection on the left eye with a 50/50 mixture of 0.75 % Marcaine and 2% Lidocaine. Good anesthesia was obtained. The patient was wheeled into the operating room in the supine position with the head resting in the head rest.  The eye was prepped and draped in the usual sterile fashion for intraocular surgery. A lid speculum and steri strips were applied to the left eyelid. A 25 gauge 4 mm infusion cannula was placed 3 ½ mm from the limbus inferotemporally, after confirming the position of the cannula, 2 superior 25 gauge sclerostomies were placed. The vitrectomy device and light pipe were inserted. The retina was found to be detached inferiorly with a breaks at 5 and 6 oclock position. The macula was found to be detached. A standard 3-port pars plana vitrectomy was then performed with thorough clearing of all the vitreous especially around the breaks. The posterior hyaloid was detached without any complications. Depressed examination of the peripheral retina revealed no other retinal pathology besides the ones mentioned above. A standard air/fluid exchange was then performed after draining all the subretinal fluid. The retina was flat after air filled the vitreous cavity. Barrier laser was  applied to cover all the breaks and suspicious areas.   After draining the subretinal fluid, the vitreous cavity was infused with 14% C3F8   The trocars and cannulas were removed and the eye was found to be watertight. Subconjunctival injection of Dexamethasone and Ancef were administered. Maxitrol ointment was instilled into the eye. The eye was patched and shielded and he was transferred to the recovery room in a stable condition. The patient tolerated the procedure well. There were no problems or complications. The patient was wheeled out of the operating room awake and alert.     Boogie Higgins MD  2019  4:24 PM no

## 2019-04-09 NOTE — ANESTHESIA POSTPROCEDURE EVALUATION
Procedure(s): VITRECTOMY POSTERIOR 25 GAUGE with air exchange laser treatment left eye. 
 
general 
 
Anesthesia Post Evaluation Multimodal analgesia: multimodal analgesia used between 6 hours prior to anesthesia start to PACU discharge Patient location during evaluation: bedside Patient participation: complete - patient participated Level of consciousness: awake Pain management: adequate Airway patency: patent Anesthetic complications: no 
Cardiovascular status: stable Respiratory status: acceptable Hydration status: acceptable Post anesthesia nausea and vomiting:  controlled No vitals data found for the desired time range.

## 2020-08-27 ENCOUNTER — HOSPITAL ENCOUNTER (OUTPATIENT)
Dept: PHYSICAL THERAPY | Age: 30
Discharge: HOME | End: 2020-08-27
Payer: COMMERCIAL

## 2020-08-27 PROCEDURE — 97110 THERAPEUTIC EXERCISES: CPT

## 2020-08-27 PROCEDURE — 97161 PT EVAL LOW COMPLEX 20 MIN: CPT
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
## 2020-08-27 NOTE — PROGRESS NOTES
PHYSICAL THERAPY - DAILY TREATMENT NOTE    Patient Name: Destinee Hoffmann        Date: 2020  : 1990   YES Patient  Verified  Visit #:      16  Insurance: Payor: Casie Ornelas / Plan: [place] [employer] / Product Type: PPO /      In time: 9:10 AM Out time: 10:23 AM   Total Treatment Time: 73     Medicare Time /BCBS Tracking (below)   Total Timed Codes (min):  na 1:1 Treatment Time:  na     TREATMENT AREA =  Cervical pain [M54.2]  Lumbar pain [M54.5]    SUBJECTIVE  Pain Level (on 0 to 10 scale):  3- 10   Medication Changes/New allergies or changes in medical history, any new surgeries or procedures? NO    If yes, update Summary List   Subjective Functional Status/Changes:  []  No changes reported     See POC       OBJECTIVE  Modalities Rationale:     decrease pain and increase tissue extensibility to improve patient's ability to perform functional tasks with more ease   min [] Estim, type/location:                                     []  att     []  unatt     []  w/US     []  w/ice    []  w/heat    min []  Mechanical Traction: type/lbs                   []  pro   []  sup   []  int   []  cont    []  before manual    []  after manual    min []  Ultrasound, settings/location:      min []  Iontophoresis w/ dexamethasone, location:                                               []  take home patch       []  in clinic   10 min []  Ice     [x]  Heat    location/position: C/S and L/S in H/L s/p eval    min []  Vasopneumatic Device, press/temp:     min []  Other:    [x] Skin assessment post-treatment (if applicable):    [x]  intact    []  redness- no adverse reaction     []redness  adverse reaction:        10 min Therapeutic Exercise:  [x]  See flow sheet   Rationale:      increase ROM and increase strength to improve the patients ability to perform functional tasks and ADL    With TE min Patient Education:  YES  Reviewed HEP, diagnosis, prognosis, POC   []  Progressed/Changed HEP based on:        Other Objective/Functional Measures:    See POC     Post Treatment Pain Level (on 0 to 10) scale:   3-4  / 10     ASSESSMENT  Assessment/Changes in Function:     Justification for Eval Code Complexity:  Patient History: Unremarkable  Examination: See exam  Clinical Presentation: evolving  Clinical Decision Making: LOW  FOTO: 43 /100     []  See Progress Note/Recertification   Patient will continue to benefit from skilled PT services to modify and progress therapeutic interventions, address functional mobility deficits, address ROM deficits, address strength deficits, analyze and address soft tissue restrictions, analyze and cue movement patterns, analyze and modify body mechanics/ergonomics, assess and modify postural abnormalities and address imbalance/dizziness to attain remaining goals. Progress toward goals / Updated goals:    See POC     PLAN  [x]  Upgrade activities as tolerated YES Continue plan of care   []  Discharge due to :    []  Other:      Therapist: Peace Jaffe PT    Date: 8/27/2020 Time: 9:09 AM     No future appointments.

## 2020-08-31 ENCOUNTER — HOSPITAL ENCOUNTER (OUTPATIENT)
Dept: PHYSICAL THERAPY | Age: 30
Discharge: HOME | End: 2020-08-31
Payer: COMMERCIAL

## 2020-08-31 PROCEDURE — 97535 SELF CARE MNGMENT TRAINING: CPT

## 2020-08-31 PROCEDURE — 97110 THERAPEUTIC EXERCISES: CPT

## 2020-08-31 PROCEDURE — 97140 MANUAL THERAPY 1/> REGIONS: CPT

## 2020-08-31 NOTE — PROGRESS NOTES
PHYSICAL THERAPY - DAILY TREATMENT NOTE    Patient Name: Otf Mackey        Date: 2020  : 1990   YES Patient  Verified  Visit #:   2  of   16  Insurance: Payor: Jeanine Baez / Plan: [place] [employer] [place] / Product Type: PPO /      In time: 4:37 pm Out time: 5:20 pm   Total Treatment Time: 43     Medicare Time /BCBS Tracking (below)   Total Timed Codes (min):  na 1:1 Treatment Time:  na     TREATMENT AREA =  Cervical pain [M54.2]  Lumbar pain [M54.5]    SUBJECTIVE  Pain Level (on 0 to 10 scale):  6  / 10- bilateral shoulders; 4-5/10 low back   Medication Changes/New allergies or changes in medical history, any new surgeries or procedures? NO    If yes, update Summary List   Subjective Functional Status/Changes:  []  No changes reported     Pt reports he had a HA today but not right now. Frequency is about the same.          OBJECTIVE  Modalities Rationale:     decrease edema, decrease inflammation, decrease pain and increase tissue extensibility to improve patient's ability to perform stair navigation, bending, standing > 15-20 min, walking        min [] Estim, type/location:                                      []  att     []  unatt     []  w/US     []  w/ice    []  w/heat    min []  Mechanical Traction: type/lbs                   []  pro   []  sup   []  int   []  cont    []  before manual    []  after manual    min []  Ultrasound, settings/location:      min []  Iontophoresis w/ dexamethasone, location:                                               []  take home patch       []  in clinic   10 min []  Ice     [x]  Heat    location/position: Supine MH to cervical and lumbar    min []  Vasopneumatic Device, press/temp:     min []  Other:    [] Skin assessment post-treatment (if applicable):    []  intact    []  redness- no adverse reaction     []redness - adverse reaction:        13 min Therapeutic Exercise:  [x]  See flow sheet   Rationale:      increase ROM and increase strength to improve the patients ability to perform stair navigation, bending, standing > 15-20 min, walking      10 min Manual Therapy: Supine gentle PROM right shoulder all planes; STM to bilateral cervical paraspinals, UT, Levator, SOR, pec release   Rationale:      decrease pain, increase ROM, increase tissue extensibility and decrease trigger points to improve patient's ability to improve tissue mobility in functional ADLs        10 min Patient Education:  YES  Reviewed HEP, sleeping positioning with pillow/towels   []  Progressed/Changed HEP based on: Other Objective/Functional Measures: Add 6 way shoulder isometrics 5x 5 second hold     Post Treatment Pain Level (on 0 to 10) scale:  0  / 10     ASSESSMENT  Assessment/Changes in Function:   Review initial HEP. Advance exercise per flow sheet with pt reporting no sx exacerbation. Add manual per POC. Pt verbalized good understanding of written HEP. Mild-moderate muscle guarding with PROM. []  See Progress Note/Recertification   Patient will continue to benefit from skilled PT services to modify and progress therapeutic interventions, address functional mobility deficits, address ROM deficits, address strength deficits, analyze and address soft tissue restrictions, analyze and cue movement patterns and instruct in home and community integration to attain remaining goals. Progress toward goals / Updated goals:    First visit from initial evaluation.  Progressed treatment per plan of care     PLAN  []  Upgrade activities as tolerated YES Continue plan of care   []  Discharge due to :    []  Other:      Therapist: Michele Madrigal PTA    Date: 8/31/2020 Time: 5:20  PM     Future Appointments   Date Time Provider Fatou Lobo   9/2/2020  4:30 PM Eden Pike PTA [facility]   9/9/2020  1:30 PM ILEANA Ovalles [facility]   9/14/2020  8:00 AM [facility]ANA Ovalles [facility]   9/16/2020  1:30 PM [facility]ANA Ovalles [facility]   9/21/2020  8:45 AM Eden Pike PTA [facility]   9/23/2020 12:45 PM Jim Began, PTA [facility]   9/28/2020  8:45 AM Jim Began, PTA [facility]   9/30/2020 11:00 AM Jim Began, PTA [facility]   10/5/2020  8:00 AM Jim Began, PTA [facility]   10/7/2020  8:00 AM Jim Began, PTA [facility]   10/12/2020  8:00 AM Jim Began, PTA [facility]   10/14/2020  8:00 AM Jim Began, PTA [facility]   10/19/2020  8:00 AM Jim Began, PTA [facility]   10/21/2020  8:00 AM Jim Began, PTA [facility]

## 2020-09-03 ENCOUNTER — HOSPITAL ENCOUNTER (OUTPATIENT)
Dept: PHYSICAL THERAPY | Age: 30
Discharge: HOME | End: 2020-09-03
Payer: COMMERCIAL

## 2020-09-03 PROCEDURE — 97110 THERAPEUTIC EXERCISES: CPT

## 2020-09-03 PROCEDURE — 97140 MANUAL THERAPY 1/> REGIONS: CPT

## 2020-09-03 NOTE — PROGRESS NOTES
PHYSICAL THERAPY - DAILY TREATMENT NOTE    Patient Name: Otf Mackey        Date: 9/3/2020  : 1990   YES Patient  Verified  Visit #:   3  of   16  Insurance: Payor: Jeanine Baez / Plan: [place] [employer] [place] / Product Type: PPO /      In time: 2:40 PM Out time: 3:34 PM   Total Treatment Time: 54     Medicare Time /BCBS Tracking (below)   Total Timed Codes (min):  na 1:1 Treatment Time:  na     TREATMENT AREA =  Cervical pain [M54.2]  Lumbar pain [M54.5]    SUBJECTIVE  Pain Level (on 0 to 10 scale):  5  / 10- right shoulder; 4-5/10 low back, 6/10 neck   Medication Changes/New allergies or changes in medical history, any new surgeries or procedures? NO    If yes, update Summary List   Subjective Functional Status/Changes:  []  No changes reported   Slight HA today, increased neck pain when he woke up today. Compliance with HEP reported.       OBJECTIVE  Modalities Rationale:     decrease edema, decrease inflammation, decrease pain and increase tissue extensibility to improve patient's ability to perform stair navigation, bending, standing > 15-20 min, walking        min [] Estim, type/location:                                      []  att     []  unatt     []  w/US     []  w/ice    []  w/heat    min []  Mechanical Traction: type/lbs                   []  pro   []  sup   []  int   []  cont    []  before manual    []  after manual    min []  Ultrasound, settings/location:      min []  Iontophoresis w/ dexamethasone, location:                                               []  take home patch       []  in clinic   10 min []  Ice     [x]  Heat    location/position: Supine MH to cervical and lumbar    min []  Vasopneumatic Device, press/temp:     min []  Other:    [x] Skin assessment post-treatment (if applicable):    [x]  intact    []  redness- no adverse reaction     []redness  adverse reaction:        34 min Therapeutic Exercise:  [x]  See flow sheet   Rationale:      increase ROM and increase strength to improve the patients ability to perform stair navigation, bending, standing > 15-20 min, walking      10 min Manual Therapy: Supine STM to bilateral cervical paraspinals, UT, Levator, SOR   Rationale:      decrease pain, increase ROM, increase tissue extensibility and decrease trigger points to improve patient's ability to improve tissue mobility in functional ADLs    X min Patient Education:  YES  Reviewed HEP   []  Progressed/Changed HEP based on: Other Objective/Functional Measures:   - pt on his phone while performing c/s stretches causing shoulder hike, VCs  - added open books     Post Treatment Pain Level (on 0 to 10) scale:  4  / 10 right sh, 3/10 back, 5/10 neck     ASSESSMENT  Assessment/Changes in Function:  Patient tolerated session well with no increase in pain. Pectoral tightness noted with open book exercises, L>R.      []  See Progress Note/Recertification   Patient will continue to benefit from skilled PT services to modify and progress therapeutic interventions, address functional mobility deficits, address ROM deficits, address strength deficits, analyze and address soft tissue restrictions, analyze and cue movement patterns and instruct in home and community integration to attain remaining goals. Progress toward goals / Updated goals:    Pt compliant with HEP.      PLAN  []  Upgrade activities as tolerated YES Continue plan of care   []  Discharge due to :    []  Other:      Therapist: ENRIQUE Paul    Date: 9/3/2020 Time: 3:34 PM     Future Appointments   Date Time Provider Fatou Lobo   9/9/2020  1:30 PM Ana Quiñones PTA [facility]   9/14/2020  8:00 AM Ana Quiñones PTA [facility]   9/16/2020  1:30 PM Ana Quiñones [facility]   9/21/2020  8:45 AM Ana Quiñones PTA [facility]   9/23/2020 12:45 PM Ana Quiñones PTA [facility]   9/28/2020  8:45 AM Ana Quiñones PTA [facility]   9/30/2020 11:00 AM Ana Quiñones PTA [facility]   10/5/2020  8:00 AM Antonino Woods, PTA [facility]   10/7/2020  8:00 AM Antonino Woods, PTA [facility]   10/12/2020  8:00 AM Antonino Woods, PTA [facility]   10/14/2020  8:00 AM Antonino Woods, PTA [facility]   10/19/2020  8:00 AM Antonino Woods, PTA [facility]   10/21/2020  8:00 AM Antonino Woods, PTA [facility]

## 2020-09-14 ENCOUNTER — APPOINTMENT (OUTPATIENT)
Dept: PHYSICAL THERAPY | Age: 30
End: 2020-09-14
Payer: COMMERCIAL

## 2020-09-14 ENCOUNTER — HOSPITAL ENCOUNTER (OUTPATIENT)
Dept: PHYSICAL THERAPY | Age: 30
Discharge: HOME | End: 2020-09-14
Payer: COMMERCIAL

## 2020-09-14 PROCEDURE — 97110 THERAPEUTIC EXERCISES: CPT

## 2020-09-14 PROCEDURE — 97530 THERAPEUTIC ACTIVITIES: CPT

## 2020-09-14 PROCEDURE — 97535 SELF CARE MNGMENT TRAINING: CPT

## 2020-09-14 NOTE — PROGRESS NOTES
PHYSICAL THERAPY - DAILY TREATMENT NOTE    Patient Name: Omar Xie        Date: 2020  : 1990   YES Patient  Verified  Visit #:   4   of   16  Insurance: Payor: Francie Marcelino / Plan: [place] [employer] PPO / Product Type: PPO /      In time: 8:15 am Out time: 8:43 am   Total Treatment Time: 27     Medicare Time /BCBS Tracking (below)   Total Timed Codes (min):  na 1:1 Treatment Time:  na     TREATMENT AREA =  Cervical pain [M54.2]  Lumbar pain [M54.5]    SUBJECTIVE  Pain Level (on 0 to 10 scale): 4 / 10- CS, L/S   Medication Changes/New allergies or changes in medical history, any new surgeries or procedures?     NO    If yes, update Summary List   Subjective Functional Status/Changes:  []  No changes reported     Pt reports his HEP is helping to          OBJECTIVE  Modalities Rationale: n/a    decrease edema, decrease inflammation, decrease pain and increase tissue extensibility to improve patient's ability to    min [] Estim, type/location:                                      []  att     []  unatt     []  w/US     []  w/ice    []  w/heat    min []  Mechanical Traction: type/lbs                   []  pro   []  sup   []  int   []  cont    []  before manual    []  after manual    min []  Ultrasound, settings/location:      min []  Iontophoresis w/ dexamethasone, location:                                               []  take home patch       []  in clinic    min []  Ice     []  Heat    location/position:     min []  Vasopneumatic Device, press/temp:     min []  Other:    [] Skin assessment post-treatment (if applicable):    []  intact    []  redness- no adverse reaction     []redness  adverse reaction:        9 min Therapeutic Exercise:  [x]  See flow sheet   Rationale:      increase ROM and increase strength to improve the patients ability to perform prolonged sitting     10 min Manual Therapy: Supine STM, DTM C/S, UT, Lev , gentle SOR    Rationale:      decrease pain, increase ROM, increase tissue extensibility and decrease trigger points to improve patient's ability to improve tissue mobility in ADLs    8 min Therapeutic Activity: Pt education in neutral spine posture in unsupported sitting and   Rationale:    increase ROM and increase strength to improve the patients ability to perform functional ADLs      With TE min Patient Education:  YES  Reviewed HEP, instruction in Home walking program as tolerate. []  Progressed/Changed HEP based on: Other Objective/Functional Measures:  Cervical AROM: FF: 100%-tight  Ext: 95%  RR: 80%-pain R C/S  LR: 80%  RSB: 60%, LSB: 50%-tight     Post Treatment Pain Level (on 0 to 10) scale:   3  / 10     ASSESSMENT  Assessment/Changes in Function:   Exercises and MH held today secondary to pt time constraints. Resume as tolerate. RRIS improving cervical AROM bilateral rotation and decreasing painful ranges     []  See Progress Note/Recertification   Patient will continue to benefit from skilled PT services to modify and progress therapeutic interventions, address functional mobility deficits, address ROM deficits, address strength deficits and instruct in home and community integration to attain remaining goals. Progress toward goals / Updated goals:  1) Pt performing HEP as prescribed to facilitate appointment carry over. - goal in progress   2) Patient will report 50% improvement in symptoms during aggravating movements to neck and low back. 3) Patient to demo cervical AROM WNL and without pain all planes in order to have improved functional mobility. 4) Patient to demo lumbar AROM WNL and without pain all planes to facilitate functional tasks, ADL, return to recreation and PLOF.      PLAN  []  Upgrade activities as tolerated YES Continue plan of care   []  Discharge due to :    []  Other:      Therapist: Ayala Olivarez PTA    Date: 9/14/2020 Time: 8:43 AM     Future Appointments   Date Time Provider Fatou Lobo   9/17/2020  2:00 PM Wilson Hargrove PTA [facility] [address]

## 2020-09-16 ENCOUNTER — APPOINTMENT (OUTPATIENT)
Dept: PHYSICAL THERAPY | Age: 30
End: 2020-09-16
Payer: COMMERCIAL

## 2020-09-17 ENCOUNTER — HOSPITAL ENCOUNTER (OUTPATIENT)
Dept: PHYSICAL THERAPY | Age: 30
Discharge: HOME | End: 2020-09-17
Payer: COMMERCIAL

## 2020-09-17 PROCEDURE — 97110 THERAPEUTIC EXERCISES: CPT

## 2020-09-17 PROCEDURE — 97140 MANUAL THERAPY 1/> REGIONS: CPT

## 2020-09-17 PROCEDURE — 97535 SELF CARE MNGMENT TRAINING: CPT

## 2020-09-17 PROCEDURE — 97014 ELECTRIC STIMULATION THERAPY: CPT

## 2020-09-17 NOTE — PROGRESS NOTES
PHYSICAL THERAPY - DAILY TREATMENT NOTE    Patient Name: Queenie Pike        Date: 2020  : 1990   YES Patient  Verified  Visit #:   4   of   16  Insurance: Payor: Alexander Artist / Plan: [place] [employer] PPO / Product Type: PPO /      In time: 2:14 pm Out time: 3:17 pm   Total Treatment Time: 63     Medicare Time /BCBS Tracking (below)   Total Timed Codes (min):  na 1:1 Treatment Time:  na     TREATMENT AREA =  Cervical pain [M54.2]  Lumbar pain [M54.5]    SUBJECTIVE  Pain Level (on 0 to 10 scale): 3 / 10- CS, L/S   Medication Changes/New allergies or changes in medical history, any new surgeries or procedures? NO    If yes, update Summary List   Subjective Functional Status/Changes:  []  No changes reported     Pt reports it is progressively getting better. He is sleeping through the night . This week has been better than last week.           OBJECTIVE  Modalities Rationale: n/a    decrease edema, decrease inflammation, decrease pain and increase tissue extensibility to improve patient's ability to   10 min [x] Estim, type/location: IFC C/S, UT; MH to low back                                       []  att     [x]  unatt     []  w/US     []  w/ice    [x]  w/heat    min []  Mechanical Traction: type/lbs                   []  pro   []  sup   []  int   []  cont    []  before manual    []  after manual    min []  Ultrasound, settings/location:      min []  Iontophoresis w/ dexamethasone, location:                                               []  take home patch       []  in clinic    min []  Ice     []  Heat    location/position:     min []  Vasopneumatic Device, press/temp:     min []  Other:    [x] Skin assessment post-treatment (if applicable):    [x]  intact    []  redness- no adverse reaction     []redness  adverse reaction:        34 min Therapeutic Exercise:  [x]  See flow sheet   Rationale:      increase ROM and increase strength to improve the patients ability to perform prolonged sitting     10 min Manual Therapy: Supine STM, DTM C/S, UT, Lev , gentle SOR    Rationale:      decrease pain, increase ROM, increase tissue extensibility and decrease trigger points to improve patient's ability to improve tissue mobility in ADLs    n/a min Therapeutic Activity:    Rationale:    increase ROM and increase strength to improve the patients ability to perform functional ADLs      9 min Patient Education:  YES  Reviewed HEP, review  Home walking program as tolerate, use of towel roll for lumbar support    []  Progressed/Changed HEP based on: Other Objective/Functional Measures: Add TA draw 10x 5 second hold, UBE 1 min Forward/back, supine glut sets, change RRIS to RRIL with improving tolerance     Post Treatment Pain Level (on 0 to 10) scale:   2  / 10     ASSESSMENT  Assessment/Changes in Function:   Add IFC with MH secondary to increased mm tone and tension in bilateral UT/C/S. Advanced core stabilization without sx exacerbation. Pt demonstrating improving C/S AROM and good pain relief after manual release and IFC with MH.      []  See Progress Note/Recertification   Patient will continue to benefit from skilled PT services to modify and progress therapeutic interventions, address functional mobility deficits, address ROM deficits, address strength deficits and instruct in home and community integration to attain remaining goals. Progress toward goals / Updated goals:  1) Pt performing HEP as prescribed to facilitate appointment carry over. - goal in progress   2) Patient will report 50% improvement in symptoms during aggravating movements to neck and low back. 3) Patient to demo cervical AROM WNL and without pain all planes in order to have improved functional mobility. 4) Patient to demo lumbar AROM WNL and without pain all planes to facilitate functional tasks, ADL, return to recreation and PLOF.      PLAN  []  Upgrade activities as tolerated YES Continue plan of care   []  Discharge due to :    []  Other: Therapist: Shamika Rushing, PTA    Date: 9/17/2020 Time: 3:17 PM     No future appointments.

## 2020-09-21 ENCOUNTER — APPOINTMENT (OUTPATIENT)
Dept: PHYSICAL THERAPY | Age: 30
End: 2020-09-21
Payer: COMMERCIAL

## 2020-09-23 ENCOUNTER — APPOINTMENT (OUTPATIENT)
Dept: PHYSICAL THERAPY | Age: 30
End: 2020-09-23
Payer: COMMERCIAL

## 2020-09-28 ENCOUNTER — APPOINTMENT (OUTPATIENT)
Dept: PHYSICAL THERAPY | Age: 30
End: 2020-09-28
Payer: COMMERCIAL

## 2020-09-30 ENCOUNTER — APPOINTMENT (OUTPATIENT)
Dept: PHYSICAL THERAPY | Age: 30
End: 2020-09-30
Payer: COMMERCIAL

## 2020-10-05 ENCOUNTER — APPOINTMENT (OUTPATIENT)
Dept: PHYSICAL THERAPY | Age: 30
End: 2020-10-05

## 2020-10-07 ENCOUNTER — APPOINTMENT (OUTPATIENT)
Dept: PHYSICAL THERAPY | Age: 30
End: 2020-10-07

## 2020-10-12 ENCOUNTER — APPOINTMENT (OUTPATIENT)
Dept: PHYSICAL THERAPY | Age: 30
End: 2020-10-12

## 2020-10-14 ENCOUNTER — APPOINTMENT (OUTPATIENT)
Dept: PHYSICAL THERAPY | Age: 30
End: 2020-10-14

## 2020-10-19 ENCOUNTER — APPOINTMENT (OUTPATIENT)
Dept: PHYSICAL THERAPY | Age: 30
End: 2020-10-19

## 2020-10-21 ENCOUNTER — APPOINTMENT (OUTPATIENT)
Dept: PHYSICAL THERAPY | Age: 30
End: 2020-10-21

## 2020-10-22 NOTE — PROGRESS NOTES
[address]  [facility] PHYSICAL THERAPY AT [facility] [address], [address], [facility], [facility] - Phone: [phone]  Fax: [phone] SUMMARY  Patient Name: Otf Mackey : 1990   Treatment/Medical Diagnosis: Cervical pain [M54.2]  Lumbar pain [M54.5]   Referral Source: Dat Denise MD     Date of Initial Visit: 20 Attended Visits: 5 Missed Visits: 3     SUMMARY OF TREATMENT    Physical therapy treatment has consisted of Therapeutic exercise for cervical and lumbar ROM and gentle strengthening, patient education in HEP, Manual therapy, Interferential electrical stimulation, and moist heat. CURRENT STATUS    Pt did not return to PT after 20 visit. Goal/Measure of Progress Goal Met? 1. Pt performing HEP as prescribed to facilitate appointment carry over. Status at last Eval: New goal Current Status: Unable to formally reassess no   2. Patient will report 50% improvement in symptoms during aggravating movements to neck and low back. Status at last Eval: New goal Current Status: Unable to formally reassess no   3. Patient to demo cervical AROM WNL and without pain all planes in order to have improved functional mobility. Status at last Eval: Cervical AROM: flex 50 pull, ext 30, SB R 30 L 30, rot R WNL L 45 - pain/pulling t/o with inc time to complete Current Status: Unable to formally reassess no   4. Patient to demo lumbar AROM WNL and without pain all planes to facilitate functional tasks, ADL, return to recreation and PLOF. Status at last Eval: Lumbar AROM/pain: flex to mid shin, ext negligible with guarding, SB R to lat mid thigh L to lat L knee, rot R 45 L 33 Current Status: Unable to formally reassess no     RECOMMENDATIONS  Discontinue therapy due to lack of attendance or compliance. If you have any questions/comments please contact us directly at [phone].   Thank you for allowing us to assist in the care of your patient.     ENRIQUE Signature: Jamel [place] Date: 9-17-20   Therapist Signature:  Time: 2:37 PM

## (undated) DEVICE — SYR 3ML LL TIP 1/10ML GRAD --

## (undated) DEVICE — Device: Brand: 25G THUMB ADJUSTABLE & INTUITIVE ENDOPROBE® BOX OF 6

## (undated) DEVICE — STERILE LATEX POWDER-FREE SURGICAL GLOVESWITH NITRILE COATING: Brand: PROTEXIS

## (undated) DEVICE — SURGICAL PROCEDURE PACK VITRECTOMY EYE CUST

## (undated) DEVICE — MEDI-VAC NON-CONDUCTIVE SUCTION TUBING 6MM X 6.1M (20 FT.) L: Brand: CARDINAL HEALTH

## (undated) DEVICE — PILLOW OR POS AD L5IN R INTUB FOAM HDRST SLOT DISP GENTLE

## (undated) DEVICE — FILTER NEEDLE: Brand: MONOJECT

## (undated) DEVICE — NEEDLE HYPO 30GA L0.5IN BGE POLYPR HUB S STL REG BVL STR

## (undated) DEVICE — SOLUTION IRRIGATION BAL SALT SOLUTION 500 ML BTL 6/CA BSS +

## (undated) DEVICE — LIGHT HANDLE: Brand: DEVON

## (undated) DEVICE — MICROSURGICAL INSTRUMENT 25GA SOFT TIP NEEDLE: Brand: ALCON

## (undated) DEVICE — 3M™ TEGADERM™ HP TRANSPARENT FILM DRESSING FRAME STYLE, 9534HP, 2-3/8 X 2-3/4 IN (6 CM X 7 CM), 100/CT 4CT/CASE: Brand: 3M™ TEGADERM™

## (undated) DEVICE — CANNULA ANTR CHMBR OPHTH WASHOUT 19GA

## (undated) DEVICE — SYR 10ML LUER LOK 1/5ML GRAD --

## (undated) DEVICE — SOL ANTI-FOG 6ML MEDC -- MEDICHOICE - CONVERT TO 358427

## (undated) DEVICE — NDL PRT INJ NSAF BLNT 18GX1.5 --

## (undated) DEVICE — SUPER VIEW® STERILE LENS PACK FOR USE WITH THE SUPER VIEW® SYSTEM AND THE BIOM®: Brand: SUPER VIEW® DISPOSABLE LENS SET

## (undated) DEVICE — SOLUTION IRRIG 1000ML H2O STRL BLT

## (undated) DEVICE — SYRINGE TB 1ML NDL 25GA L0.625IN PLAS SLIP TIP CONVENTIONAL

## (undated) DEVICE — LENS VITRCTMY FLAT DISP